# Patient Record
(demographics unavailable — no encounter records)

---

## 2017-01-01 NOTE — HP
- Maternal History


HBSAG: Negative


Date: 16


RPR: Negative


Date: 16


Group B Strep: Negative


HIV: Negative





- Maternal Risks


OB Risks: Ecogenic focus on US.  1 episode syncope 2017.  Anemia, hypotension





 Data





- Admission


Date of Admission: 03/10/17


Admission Time: 09:08


Date of Delivery: 03/10/17


Time of Delivery: 08:29


Wks Gestation by Dates: 38.3


Wks Gestation by Sono: 38.3


Infant Gender: Female


Type of Delivery: 


Apgar Score @1 Minute: 8


Apgar score @ 5 Minutes: 9


Birth Weight: 2.97 kg


Birth Length: 18 in


Head Circumference, Admission: 34


Chest Circumference: 30.5


Abdominal Girth: 32





- Vital Signs


  ** Left Calf


Blood Pressure: 65/41


Blood Pressure Mean: 49





  ** Right Calf


Blood Pressure: 66/42


Blood Pressure Mean: 50





  ** Right Upper Arm


Blood Pressure: 69/34


Blood Pressure Mean: 45





  ** Left Upper Arm


Blood Pressure: 68/47


Blood Pressure Mean: 54





- Hearing Screen


Left Ear: Passed


Right Ear: Passed


Hearing Screen Complete: 17





- Labs


Labs: 


 Baby's Blood Type, Hansel











Cord Blood Type  O POSITIVE   03/10/17  08:30    


 


OMKAR, Poly Interpret  Negative  (NEGATIVE)   03/10/17  08:30    














- [facility] Screening


 Screening Card Number: [id number]





 Infant, Physical Exam





-  Infant, Admission Exam


Birth Weight: 2.97 kg


Birth Length: 18 in


Chest Circumference: 30.5


Initial Vital Signs: 


 Initial Vital Signs











Temp Pulse Resp


 


 97 F L  131   56 


 


 03/10/17 10:33  03/10/17 10:33  03/10/17 10:33











General Appearance: Yes: No Abnormalities, Pink


Skin: Yes: No Abnormalities


Head: Yes: No Abnormalities, Fontanel flat


Eyes: Yes: No Abnormalities, Clear, Red reflex present (bilaterally)


Ears: Yes: No Abnormalities, Symmetrical.  No: Low set, Periauricular sinus, 

Periauricular skin tag


Nose: Yes: No Abnormalities, Nares patent


Mouth: Yes: No Abnormalities.  No: Cleft lip, Cleft palate


Chest: Yes: No Abnormalities, Symmetrical, Clavicles intact


Lungs/Respiratory: Yes: No Abnormalities, Clear, Bilateral good air entry


Cardiac: Yes: No Abnormalities, S1, S2.  No: Murmur


Abdomen: Yes: No Abnormalities


Gastrointestinal: Yes: No Abnormalities, Active bowel sounds


Genitalia: No Abnormalities


Genitalia, Female: Yes: Labia Normal


Anus: Yes: No Abnormalities, Patent


Extremities: Yes: No Abnormalities, 10 Fingers, 10 Toes


Clavicles: No abnormalities


Femoral Pulse: Strong


Ortolani Test: Negative


Blackwood Test: Negative


Spine: Yes: No Abnormalities.  No: Sacral tracts, Sacral dimple, Hair tuft


Reflexes: Zain: Present (symmetric), Rooting: Present, Sucking: Present (

vigorous)


Neuro: Yes: No Abnormalities, Alert, Active


Cry: Yes: No Abnormalities, Strong





Problem List





- Problems


(1) Single liveborn, born in hospital, delivered by vaginal delivery


Assessment/Plan: 


Ex-38 week  AGA (6lb 8oz) female, APGAR 8/9 at 1/5 min respectively. Born 

to a  mother with negative maternal labs, echogenic focus on US, maternal 

history sig for syncope, anemia and hypotension. MBT O pos, BBT O pos, Hansel 

neg. Mother refused Hepatitis B vaccine yesterday, will discuss risks/benefits 

again with her today. Plan: 1. Encourage breastfeeding; 2. Routine  

care. 





Code(s): Z38.00 - SINGLE LIVEBORN INFANT, DELIVERED VAGINALLY